# Patient Record
Sex: FEMALE | Race: WHITE
[De-identification: names, ages, dates, MRNs, and addresses within clinical notes are randomized per-mention and may not be internally consistent; named-entity substitution may affect disease eponyms.]

---

## 2020-08-14 ENCOUNTER — TRANSCRIPTION ENCOUNTER (OUTPATIENT)
Age: 55
End: 2020-08-14

## 2021-11-16 PROBLEM — Z00.00 ENCOUNTER FOR PREVENTIVE HEALTH EXAMINATION: Status: ACTIVE | Noted: 2021-11-16

## 2021-12-03 ENCOUNTER — APPOINTMENT (OUTPATIENT)
Dept: CARDIOLOGY | Facility: CLINIC | Age: 56
End: 2021-12-03
Payer: COMMERCIAL

## 2021-12-03 ENCOUNTER — NON-APPOINTMENT (OUTPATIENT)
Age: 56
End: 2021-12-03

## 2021-12-03 VITALS
BODY MASS INDEX: 26.83 KG/M2 | RESPIRATION RATE: 20 BRPM | HEIGHT: 65.5 IN | OXYGEN SATURATION: 99 % | DIASTOLIC BLOOD PRESSURE: 82 MMHG | WEIGHT: 163 LBS | HEART RATE: 81 BPM | SYSTOLIC BLOOD PRESSURE: 128 MMHG

## 2021-12-03 DIAGNOSIS — Z78.9 OTHER SPECIFIED HEALTH STATUS: ICD-10-CM

## 2021-12-03 DIAGNOSIS — R00.2 PALPITATIONS: ICD-10-CM

## 2021-12-03 PROCEDURE — 93000 ELECTROCARDIOGRAM COMPLETE: CPT

## 2021-12-03 PROCEDURE — 99203 OFFICE O/P NEW LOW 30 MIN: CPT

## 2021-12-03 RX ORDER — CHROMIUM 200 MCG
TABLET ORAL
Refills: 0 | Status: ACTIVE | COMMUNITY

## 2021-12-03 NOTE — HISTORY OF PRESENT ILLNESS
[FreeTextEntry1] : Cecilia Becerril is a healthy 56-year-old woman with no chronic medical problems who presents for evaluation of palpitations. She describes multiple recent episodes of mild tachycardia (first noticed on her Fit Bit monitor but also confirmed with manual radial pulse measurement) had seen out of proportion to her activity at the time.  Her example, she describes an episode of palpitations and tachycardia (approximately 130 beats per minute) while driving; hold off to the side of the road and "talked [herself ]down."  She denies history of anxiety and thinks that she manages her stressors fairly well.  She has not been experiencing chest pain or dyspnea.  There is no family history of premature heart disease or sudden death.  He describes a good baseline functional status -- walks for exercise.

## 2021-12-03 NOTE — REVIEW OF SYSTEMS
This morning, patient's blood glucose was 61. Patient received D10 per protocol. Patient did receive 34 units of Lantus in the prior evening. When reviewing patient's blood glucoses for the past few shifts, it appears his blood glucose was 64 the morning of 12/15. According to [facility], patient had also received 34 units of Lantus the evening before that. This RN informed Reshma uQintero MD via [employer]. Bedside shift change report given to [address] (oncoming nurse) by Alejandra Reynolds (offgoing nurse). Report included the following information SBAR, Kardex and MAR. [SOB] : no shortness of breath [Chest Discomfort] : no chest discomfort [Palpitations] : palpitations [Syncope] : no syncope [Negative] : Heme/Lymph

## 2021-12-03 NOTE — DISCUSSION/SUMMARY
[FreeTextEntry1] : \par Palpitations: Recent onset of mild palpitations associated with tachycardia - normal resting ECG today; return for echocardiography to exclude the presence of structural heart abnormalities and 5 day extended length ECG.

## 2021-12-03 NOTE — PHYSICAL EXAM
[Normal S1, S2] : normal S1, S2 [No Murmur] : no murmur [Clear Lung Fields] : clear lung fields [Soft] : abdomen soft [Normal Bowel Sounds] : normal bowel sounds [Normal Gait] : normal gait [No Edema] : no edema [No Rash] : no rash [Moves all extremities] : moves all extremities [Alert and Oriented] : alert and oriented [de-identified] : Appears her stated age and well [de-identified] : Pupils are round [de-identified] : Wearing a face mask (KN95) [de-identified] : No JVD

## 2021-12-30 ENCOUNTER — APPOINTMENT (OUTPATIENT)
Dept: CARDIOLOGY | Facility: CLINIC | Age: 56
End: 2021-12-30
Payer: COMMERCIAL

## 2021-12-30 PROCEDURE — 93306 TTE W/DOPPLER COMPLETE: CPT

## 2022-06-19 ENCOUNTER — OFFICE (OUTPATIENT)
Dept: URBAN - METROPOLITAN AREA CLINIC 12 | Facility: CLINIC | Age: 57
Setting detail: OPHTHALMOLOGY
End: 2022-06-19
Payer: COMMERCIAL

## 2022-06-19 DIAGNOSIS — S05.02XA: ICD-10-CM

## 2022-06-19 PROCEDURE — 99203 OFFICE O/P NEW LOW 30 MIN: CPT | Performed by: OPTOMETRIST

## 2022-06-19 ASSESSMENT — KERATOMETRY
OD_K2POWER_DIOPTERS: 44.00
OD_K1POWER_DIOPTERS: 43.50
OD_AXISANGLE_DEGREES: 079
OS_K1POWER_DIOPTERS: 43.00
OS_K2POWER_DIOPTERS: 44.25
OS_AXISANGLE_DEGREES: 084

## 2022-06-19 ASSESSMENT — REFRACTION_CURRENTRX
OD_SPHERE: -0.25
OD_OVR_VA: 20/
OD_CYLINDER: -1.25
OD_AXIS: 142
OS_AXIS: 179
OS_OVR_VA: 20/
OS_CYLINDER: -1.50
OD_VPRISM_DIRECTION: PROGS
OD_ADD: +2.50
OS_SPHERE: PLANO
OS_ADD: +2.50

## 2022-06-19 ASSESSMENT — TONOMETRY
OS_IOP_MMHG: 14
OD_IOP_MMHG: 13

## 2022-06-19 ASSESSMENT — REFRACTION_AUTOREFRACTION
OS_CYLINDER: -1.75
OD_CYLINDER: -1.00
OS_SPHERE: +0.25
OS_AXIS: 010
OD_AXIS: 154
OD_SPHERE: -0.25

## 2022-06-19 ASSESSMENT — VISUAL ACUITY
OD_BCVA: 20/30
OS_BCVA: 20/20-1

## 2022-06-19 ASSESSMENT — SPHEQUIV_DERIVED
OS_SPHEQUIV: -0.625
OD_SPHEQUIV: -0.75

## 2022-06-19 ASSESSMENT — AXIALLENGTH_DERIVED
OD_AL: 23.794
OS_AL: 23.7912

## 2022-06-19 ASSESSMENT — CORNEAL TRAUMA - ABRASION: OS_ABRASION: PRESENT

## 2022-06-19 ASSESSMENT — CONFRONTATIONAL VISUAL FIELD TEST (CVF)
OS_FINDINGS: FULL
OD_FINDINGS: FULL

## 2022-07-07 ENCOUNTER — OFFICE (OUTPATIENT)
Dept: URBAN - METROPOLITAN AREA CLINIC 102 | Facility: CLINIC | Age: 57
Setting detail: OPHTHALMOLOGY
End: 2022-07-07
Payer: COMMERCIAL

## 2022-07-07 DIAGNOSIS — S05.02XD: ICD-10-CM

## 2022-07-07 PROCEDURE — 99213 OFFICE O/P EST LOW 20 MIN: CPT | Performed by: OPTOMETRIST

## 2022-07-07 ASSESSMENT — SPHEQUIV_DERIVED
OD_SPHEQUIV: -0.625
OS_SPHEQUIV: -0.375

## 2022-07-07 ASSESSMENT — KERATOMETRY
OS_K1POWER_DIOPTERS: 43.25
OS_K2POWER_DIOPTERS: 44.25
OD_K2POWER_DIOPTERS: 44.00
OS_AXISANGLE_DEGREES: 099
OD_AXISANGLE_DEGREES: 086
OD_K1POWER_DIOPTERS: 43.75

## 2022-07-07 ASSESSMENT — REFRACTION_CURRENTRX
OD_ADD: +2.50
OD_AXIS: 142
OS_AXIS: 179
OS_OVR_VA: 20/
OS_SPHERE: PLANO
OD_OVR_VA: 20/
OS_CYLINDER: -1.50
OD_CYLINDER: -1.25
OD_VPRISM_DIRECTION: PROGS
OD_SPHERE: -0.25
OS_ADD: +2.50

## 2022-07-07 ASSESSMENT — AXIALLENGTH_DERIVED
OS_AL: 23.6463
OD_AL: 23.6981

## 2022-07-07 ASSESSMENT — VISUAL ACUITY
OD_BCVA: 20/30-2
OS_BCVA: 20/20-1

## 2022-07-07 ASSESSMENT — TONOMETRY
OS_IOP_MMHG: 14
OD_IOP_MMHG: 14

## 2022-07-07 ASSESSMENT — REFRACTION_AUTOREFRACTION
OS_CYLINDER: -1.25
OD_SPHERE: 0.00
OS_SPHERE: +0.25
OS_AXIS: 010
OD_AXIS: 158
OD_CYLINDER: -1.25

## 2022-07-07 ASSESSMENT — CORNEAL TRAUMA - ABRASION: OS_ABRASION: ABSENT

## 2022-07-07 ASSESSMENT — CONFRONTATIONAL VISUAL FIELD TEST (CVF)
OD_FINDINGS: FULL
OS_FINDINGS: FULL

## 2024-01-12 ENCOUNTER — OFFICE (OUTPATIENT)
Dept: URBAN - METROPOLITAN AREA CLINIC 102 | Facility: CLINIC | Age: 59
Setting detail: OPHTHALMOLOGY
End: 2024-01-12
Payer: COMMERCIAL

## 2024-01-12 DIAGNOSIS — H43.393: ICD-10-CM

## 2024-01-12 DIAGNOSIS — Z79.899: ICD-10-CM

## 2024-01-12 DIAGNOSIS — H25.13: ICD-10-CM

## 2024-01-12 PROCEDURE — 92014 COMPRE OPH EXAM EST PT 1/>: CPT | Performed by: STUDENT IN AN ORGANIZED HEALTH CARE EDUCATION/TRAINING PROGRAM

## 2024-01-12 ASSESSMENT — REFRACTION_CURRENTRX
OS_ADD: +2.50
OD_OVR_VA: 20/
OD_VPRISM_DIRECTION: PROGS
OD_ADD: +2.50
OS_OVR_VA: 20/
OD_CYLINDER: -1.00
OS_AXIS: 177
OD_SPHERE: -0.25
OS_SPHERE: PLANO
OS_VPRISM_DIRECTION: PROGS
OS_CYLINDER: -1.00
OD_AXIS: 148

## 2024-01-12 ASSESSMENT — CONFRONTATIONAL VISUAL FIELD TEST (CVF)
OS_FINDINGS: FULL
OD_FINDINGS: FULL

## 2024-01-12 ASSESSMENT — REFRACTION_AUTOREFRACTION
OS_AXIS: 009
OD_CYLINDER: -1.00
OS_CYLINDER: -1.25
OS_SPHERE: +0.25
OD_AXIS: 148
OD_SPHERE: PLANO

## 2024-01-12 ASSESSMENT — SPHEQUIV_DERIVED: OS_SPHEQUIV: -0.375

## 2025-02-01 ENCOUNTER — OFFICE (OUTPATIENT)
Dept: URBAN - METROPOLITAN AREA CLINIC 12 | Facility: CLINIC | Age: 60
Setting detail: OPHTHALMOLOGY
End: 2025-02-01
Payer: COMMERCIAL

## 2025-02-01 DIAGNOSIS — Z79.899: ICD-10-CM

## 2025-02-01 DIAGNOSIS — H43.393: ICD-10-CM

## 2025-02-01 DIAGNOSIS — H25.13: ICD-10-CM

## 2025-02-01 PROCEDURE — 92014 COMPRE OPH EXAM EST PT 1/>: CPT | Performed by: STUDENT IN AN ORGANIZED HEALTH CARE EDUCATION/TRAINING PROGRAM

## 2025-02-01 ASSESSMENT — REFRACTION_MANIFEST
OD_VA1: 20/20
OS_SPHERE: +0.25
OD_SPHERE: +0.25
OD_CYLINDER: -1.25
OS_VA1: 20/25
OS_AXIS: 010
OD_AXIS: 155
OS_CYLINDER: -1.25

## 2025-02-01 ASSESSMENT — REFRACTION_CURRENTRX
OD_VPRISM_DIRECTION: PROGS
OS_ADD: +2.50
OD_SPHERE: -0.25
OS_OVR_VA: 20/
OD_OVR_VA: 20/
OS_SPHERE: PLANO
OD_CYLINDER: -1.00
OS_AXIS: 177
OS_CYLINDER: -1.00
OD_AXIS: 148
OS_VPRISM_DIRECTION: PROGS
OD_ADD: +2.50

## 2025-02-01 ASSESSMENT — REFRACTION_AUTOREFRACTION
OD_SPHERE: +0.25
OS_SPHERE: +0.25
OD_CYLINDER: -1.25
OS_AXIS: 012
OS_CYLINDER: -1.25
OD_AXIS: 154

## 2025-02-01 ASSESSMENT — KERATOMETRY
OS_K1POWER_DIOPTERS: 43.25
OD_K2POWER_DIOPTERS: 44.25
OD_AXISANGLE_DEGREES: 073
OS_AXISANGLE_DEGREES: 083
OD_K1POWER_DIOPTERS: 43.50
METHOD_AUTO_MANUAL: AUTO
OS_K2POWER_DIOPTERS: 44.50

## 2025-02-01 ASSESSMENT — VISUAL ACUITY
OS_BCVA: 20/20
OD_BCVA: 20/25-2

## 2025-02-01 ASSESSMENT — TONOMETRY
OS_IOP_MMHG: 11
OD_IOP_MMHG: 11

## 2025-02-01 ASSESSMENT — CONFRONTATIONAL VISUAL FIELD TEST (CVF)
OD_FINDINGS: FULL
OS_FINDINGS: FULL

## 2025-03-08 ENCOUNTER — EMERGENCY (EMERGENCY)
Facility: HOSPITAL | Age: 60
LOS: 0 days | Discharge: HOME | End: 2025-03-08
Attending: EMERGENCY MEDICINE
Payer: COMMERCIAL

## 2025-03-08 VITALS
DIASTOLIC BLOOD PRESSURE: 67 MMHG | TEMPERATURE: 98 F | WEIGHT: 154.32 LBS | RESPIRATION RATE: 17 BRPM | HEART RATE: 94 BPM | OXYGEN SATURATION: 99 % | SYSTOLIC BLOOD PRESSURE: 145 MMHG

## 2025-03-08 VITALS — HEIGHT: 65 IN

## 2025-03-08 DIAGNOSIS — Z88.0 ALLERGY STATUS TO PENICILLIN: ICD-10-CM
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
## 2025-03-08 DIAGNOSIS — M25.532 PAIN IN LEFT WRIST: ICD-10-CM

## 2025-03-08 DIAGNOSIS — S01.81XA LACERATION WITHOUT FOREIGN BODY OF OTHER PART OF HEAD, INITIAL ENCOUNTER: ICD-10-CM

## 2025-03-08 DIAGNOSIS — S01.111A LACERATION WITHOUT FOREIGN BODY OF RIGHT EYELID AND PERIOCULAR AREA, INITIAL ENCOUNTER: ICD-10-CM

## 2025-03-08 DIAGNOSIS — M35.3 POLYMYALGIA RHEUMATICA: ICD-10-CM

## 2025-03-08 DIAGNOSIS — Z23 ENCOUNTER FOR IMMUNIZATION: ICD-10-CM
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
## 2025-03-08 PROCEDURE — 90471 IMMUNIZATION ADMIN: CPT

## 2025-03-08 PROCEDURE — 90715 TDAP VACCINE 7 YRS/> IM: CPT

## 2025-03-08 PROCEDURE — 99284 EMERGENCY DEPT VISIT MOD MDM: CPT | Mod: 25

## 2025-03-08 PROCEDURE — 73110 X-RAY EXAM OF WRIST: CPT | Mod: LT

## 2025-03-08 PROCEDURE — 12011 RPR F/E/E/N/L/M 2.5 CM/<: CPT

## 2025-03-08 PROCEDURE — 73110 X-RAY EXAM OF WRIST: CPT | Mod: 26,LT

## 2025-03-08 PROCEDURE — 99283 EMERGENCY DEPT VISIT LOW MDM: CPT | Mod: 25

## 2025-03-08 RX ORDER — CLOSTRIDIUM TETANI TOXOID ANTIGEN (FORMALDEHYDE INACTIVATED), CORYNEBACTERIUM DIPHTHERIAE TOXOID ANTIGEN (FORMALDEHYDE INACTIVATED), BORDETELLA PERTUSSIS TOXOID ANTIGEN (GLUTARALDEHYDE INACTIVATED), BORDETELLA PERTUSSIS FILAMENTOUS HEMAGGLUTININ ANTIGEN (FORMALDEHYDE INACTIVATED), BORDETELLA PERTUSSIS PERTACTIN ANTIGEN, AND BORDETELLA PERTUSSIS FIMBRIAE 2/3 ANTIGEN 5; 2; 2.5; 5; 3; 5 [LF]/.5ML; [LF]/.5ML; UG/.5ML; UG/.5ML; UG/.5ML; UG/.5ML
0.5 INJECTION, SUSPENSION INTRAMUSCULAR ONCE
Refills: 0 | Status: COMPLETED | OUTPATIENT
Start: 2025-03-08 | End: 2025-03-08

## 2025-03-08 RX ADMIN — CLOSTRIDIUM TETANI TOXOID ANTIGEN (FORMALDEHYDE INACTIVATED), CORYNEBACTERIUM DIPHTHERIAE TOXOID ANTIGEN (FORMALDEHYDE INACTIVATED), BORDETELLA PERTUSSIS TOXOID ANTIGEN (GLUTARALDEHYDE INACTIVATED), BORDETELLA PERTUSSIS FILAMENTOUS HEMAGGLUTININ ANTIGEN (FORMALDEHYDE INACTIVATED), BORDETELLA PERTUSSIS PERTACTIN ANTIGEN, AND BORDETELLA PERTUSSIS FIMBRIAE 2/3 ANTIGEN 0.5 MILLILITER(S): 5; 2; 2.5; 5; 3; 5 INJECTION, SUSPENSION INTRAMUSCULAR at 10:56

## 2025-03-08 NOTE — ED STATDOCS - OBJECTIVE STATEMENT
58 y/o female w/ no pertinent PMHx presenting to the ED from  for possible stitched s/p trip and fall earlier today. Pt has +HS, -LOC, and -AC. Pt reports that she was walking her dog this morning, when her dog suddenly ran forward causing her to leg of the leash and trip and fall forward. Pt landed on her bilateral wrists first and then hit the R side of her head on the sidewalk. Pt denies any dizziness, visual changes, nausea, vomiting. Pt endorses L wrist pain. 60 y/o female w/ no pertinent PMHx presenting to the ED from  for possible stitches s/p trip and fall earlier today. Pt has +HS, -LOC, and -AC. Pt reports that she was walking her dog this morning, when her dog suddenly ran forward causing her to leg of the leash and trip and fall forward. Pt landed on her bilateral wrists first and then hit the R side of her head on the sidewalk. Pt denies any dizziness, visual changes, nausea, vomiting. Pt endorses L wrist pain.

## 2025-03-08 NOTE — ED ADULT TRIAGE NOTE - CHIEF COMPLAINT QUOTE
Pt ambulatory to ED sent by  for possible stiches s/p trip and fall. states she was walking her dog and lost the leash and tripped landing on b/l wrists and then hit R side of head on asphalt. Denies LOC, AC/asa usage, dizziness, visual changes. 2 cm laceration noted to pts R eyebrow, no active bleeding. Pt A&O4 in triage.

## 2025-03-08 NOTE — ED STATDOCS - NSFOLLOWUPINSTRUCTIONS_ED_ALL_ED_FT
FOLLOW UP WITH A HAND DOCTOR AS NEEDED. CALL THE OFFICE TO MAKE AN APPOINTMENT. RETURN TO ER FOR ANY WORSENING SYMPTOMS OR NEW CONCERNS.     Laceration    WHAT YOU NEED TO KNOW:    A laceration is an injury to the skin and the soft tissue underneath it. Lacerations happen when you are cut or hit by something. They can happen anywhere on the body.     DISCHARGE INSTRUCTIONS:    Return to the emergency department if:     You have heavy bleeding or bleeding that does not stop after 10 minutes of holding firm, direct pressure over the wound.       Your wound opens up.     Contact your healthcare provider if:     You have a fever or chills.       Your laceration is red, warm, or swollen.      You have red streaks on your skin coming from your wound.      You have white or yellow drainage from the wound that smells bad.      You have pain that gets worse, even after treatment.       You have questions or concerns about your condition or care.     Medicines:     Prescription pain medicine may be given. Ask how to take this medicine safely.       Antibiotics help treat or prevent a bacterial infection.       Take your medicine as directed. Contact your healthcare provider if you think your medicine is not helping or if you have side effects. Tell him or her if you are allergic to any medicine. Keep a list of the medicines, vitamins, and herbs you take. Include the amounts, and when and why you take them. Bring the list or the pill bottles to follow-up visits. Carry your medicine list with you in case of an emergency.    Care for your wound as directed:     Do not get your wound wet until your healthcare provider says it is okay. Do not soak your wound in water. Do not go swimming until your healthcare provider says it is okay. Carefully wash the wound with soap and water. Gently pat the area dry or allow it to air dry.       Change your bandages when they get wet, dirty, or after washing. Apply new, clean bandages as directed. Do not apply elastic bandages or tape too tight. Do not put powders or lotions over your incision.       Apply antibiotic ointment as directed. Your healthcare provider may give you antibiotic ointment to put over your wound if you have stitches. If you have strips of tape over your incision, let them dry up and fall off on their own. If they do not fall off within 14 days, gently remove them. If you have glue over your wound, do not remove or pick at it. If your glue comes off, do not replace it with glue that you have at home.       Check your wound every day for signs of infection such as swelling, redness, or pus.     Self-care:     Apply ice on your wound for 15 to 20 minutes every hour or as directed. Use an ice pack, or put crushed ice in a plastic bag. Cover it with a towel. Ice helps prevent tissue damage and decreases swelling and pain.      Use a splint as directed. A splint will decrease movement and stress on your wound. It may help it heal faster. A splint may be used for lacerations over joints or areas of your body that bend. Ask your healthcare provider how to apply and remove a splint.       Decrease scarring of your wound by applying ointments as directed. Do not apply ointments until your healthcare provider says it is okay. You may need to wait until your wound is healed. Ask which ointment to buy and how often to use it. After your wound is healed, use sunscreen over the area when you are out in the sun. You should do this for at least 6 months to 1 year after your injury.     Follow up with your healthcare provider as directed: You may need to follow up in 24 to 48 hours to have your wound checked for infection. You will need to return in 3 to 14 days if you have stitches or staples so they can be removed. Care for your wound as directed to prevent infection and help it heal. Write down your questions so you remember to ask them during your visits.     Wrist Pain, Adult  There are many things that can cause wrist pain. Some common causes include:  An injury to the wrist area, such as a sprain, strain, or fracture.Overuse of the joint.A condition that causes increased pressure on a nerve in the wrist (carpal tunnel syndrome).Wear and tear of the joints that occurs with aging (osteoarthritis).A variety of other types of arthritis.Sometimes, the cause of wrist pain is not known. Often, the pain goes away when you follow instructions from your health care provider for relieving pain at home, such as resting or icing the wrist. If your wrist pain continues, it is important to tell your health care provider.  Follow these instructions at home:  Rest the wrist area for at least 48 hours or as long as told by your health care provider.If a splint or elastic bandage has been applied, use it as told by your health care provider.  Remove the splint or bandage only as told by your health care provider.Loosen the splint or bandage if your fingers tingle, become numb, or turn cold or blue.If directed, apply ice to the injured area.  If you have a removable splint or elastic bandage, remove it as told by your health care provider.Put ice in a plastic bag.Place a towel between your skin and the bag or between your splint or bandage and the bag.Leave the ice on for 20 minutes, 2–3 times a day.Keep your arm raised (elevated) above the level of your heart while you are sitting or lying down.Take over-the-counter and prescription medicines only as told by your health care provider.Keep all follow-up visits as told by your health care provider. This is important.Contact a health care provider if:  You have a sudden sharp pain in the wrist, hand, or arm that is different or new.The swelling or bruising on your wrist or hand gets worse.Your skin becomes red, gets a rash, or has open sores.Your pain does not get better or it gets worse.Get help right away if:  You lose feeling in your fingers or hand.Your fingers turn white, very red, or cold and blue.You cannot move your fingers.You have a fever or chills.This information is not intended to replace advice given to you by your health care provider. Make sure you discuss any questions you have with your health care provider.

## 2025-03-08 NOTE — ED STATDOCS - PHYSICAL EXAMINATION
Physical Exam:  Gen: NAD, non-toxic appearing, able to ambulate without assistance  Head: NCAT  HEENT: EOMI, PEERLA, normal conjunctiva, tongue midline, oral mucosa moist  Lung: CTAB, no respiratory distress, no wheezes/rhonchi/rales B/L, speaking in full sentences  CV: RRR, no murmurs, rubs or gallops, distal pulses 2+ b/l  Abd: soft, nontender, no distention, no guarding, no rigidity, no rebound tenderness  MSK: L wrist with no bond tenderness and full ROM, no deformity.   Skin: 2 cm  above the R eyebrow. Not curvilinear, no active bleeding.   Psych: normal affect, calm

## 2025-03-08 NOTE — ED STATDOCS - ATTENDING APP SHARED VISIT CONTRIBUTION OF CARE
I,Chris Neal MD,  performed the initial face to face bedside interview with this patient regarding history of present illness, review of symptoms and relevant past medical, social and family history.  I completed an independent physical examination.  I was the initial provider who evaluated this patient. I have signed out the follow up of any pending tests (i.e. labs, radiological studies) to the ACP.  I have communicated the patient’s plan of care and disposition with the ACP.  The history, relevant review of systems, past medical and surgical history, medical decision making, and physical examination was documented by the scribe in my presence and I attest to the accuracy of the documentation.

## 2025-03-08 NOTE — ED STATDOCS - CLINICAL SUMMARY MEDICAL DECISION MAKING FREE TEXT BOX
Pt w/ fall of the his morning. braced her hands,  but hit the R side of her head. Pt denies any AC or LOC. Pt has no neuro deficits, no need for CT at this time. Will repair R eyebrow laceration  and x-ray L wrist.

## 2025-03-08 NOTE — ED STATDOCS - PROGRESS NOTE DETAILS
signed Ana Maria Gurrola PA-C Pt seen initially in intake by Dr Neal.   59F sent for eval from urgent care for right eyebrow lac s/p fall. pt was walking her dog around 9am when she tripped and fell, landing on her hands and then onto her right side, hitting her head. no LOC or anticoagulation. No HA/N/V/neck pain. Pt c/o mild left wrist pain. PMH polymyalgia rheumatica. Pt alert, NAD GCS 15. 2cm linear well approximated lac of right eyebrow. signed Ana Maria Gurrola PA-C   After DC xray report notes possible triquetral fx. I spoke to on call Dr Carcamo who advised pt may f/u with him in his office this week. I called pt and advised her of the findings, offered for her to return for splinting, pt declines at this time. signed Ana Maria Gurrola PA-C Pt seen initially in intake by Dr Neal.   59F sent for eval from urgent care for right eyebrow lac s/p fall. pt was walking her dog around 9am when she tripped and fell, landing on her hands and then onto her right side, hitting her head. no LOC or anticoagulation. No HA/N/V/neck pain. Pt c/o mild left wrist pain, pt has full AROM, no swelling or snuffbox TTP, no ecchymosis. xray NAD. Offered pt splint, she declines.   PMH polymyalgia rheumatica. Pt alert, NAD GCS 15. 2cm linear well approximated lac of right eyebrow. simple lac repair. signed Ana Maria Gurrola PA-C   Pt returned to ER for splinting, she spoke to Dr Carcamo who said she could try a velcro wrist splint at home. pt tried but it didn't help much and pt says her wrist pain is increasing. Pt now self splinting left wrist and has more pain with ROM, limiting movement. Will place in volar splint and pt to f/u as outpt with Dr Carcamo. return precautions given. Pt feeling well, pt and family agree with DC and plan of care. signed Ana Maria Gurrola PA-C   Pt returned to ER for splinting, she spoke to Dr Carcamo who said she could try a velcro wrist splint at home. pt tried but it didn't help much and pt says her wrist pain is increasing. Pt now self splinting left wrist and has more pain with ROM, limiting movement. I spoke to dr Carcamo, he will come to ED to see pt.. Pt agrees with plan of  care. signed Ana Maria Gurrola PA-C   Pt seen in ED by Dr Phelps, placed in short arm cast. outpt f/u with Dr phelps in office. Pt feeling well, pt and family agree with DC and plan of care.

## 2025-03-08 NOTE — ED ADULT NURSE NOTE - NSFALLUNIVINTERV_ED_ALL_ED
Bed/Stretcher in lowest position, wheels locked, appropriate side rails in place/Call bell, personal items and telephone in reach/Instruct patient to call for assistance before getting out of bed/chair/stretcher/Non-slip footwear applied when patient is off stretcher/[place] to call system/Physically safe environment - no spills, clutter or unnecessary equipment/Purposeful proactive rounding/Room/bathroom lighting operational, light cord in reach

## 2025-03-08 NOTE — ED STATDOCS - CARE PROVIDER_API CALL
Bart Mann  Surgery of the Hand  [address] [place] [address]  Phone: [phone]  Fax: [phone]  Follow Up Time:    Odilia Carcamo  Orthopaedic Surgery  [address] [place] [address]  Phone: [phone]  Fax: [phone]  Follow Up Time:

## 2025-03-08 NOTE — ED STATDOCS - PATIENT PORTAL LINK FT
You can access the FollowMyHealth Patient Portal offered by [facility] by registering at the following website: [email or web address]. By joining [facility]’s FollowMyHealth portal, you will also be able to view your health information using other applications (apps) compatible with our system.

## 2025-03-08 NOTE — ED ADULT NURSE NOTE - OBJECTIVE STATEMENT
60 y/o female awake alert and oriented x4 presents to ED s/p trip and fall earlier today. + HS denies loc. Not on AC. Pt was walking her dog this morning, tripped and fell forward.  Pt landed on her bilateral wrists first and then hit the R side of her head on the sidewalk. Pt denies any dizziness, visual changes, nausea, vomiting. Pt endorses L wrist pain.